# Patient Record
Sex: FEMALE | Race: OTHER
[De-identification: names, ages, dates, MRNs, and addresses within clinical notes are randomized per-mention and may not be internally consistent; named-entity substitution may affect disease eponyms.]

---

## 2019-09-25 ENCOUNTER — HOSPITAL ENCOUNTER (OUTPATIENT)
Dept: HOSPITAL 72 - PAN | Age: 59
Discharge: HOME | End: 2019-09-25
Payer: MEDICAID

## 2019-09-25 DIAGNOSIS — Z01.818: Primary | ICD-10-CM

## 2019-09-25 DIAGNOSIS — I10: ICD-10-CM

## 2019-09-25 NOTE — CONSULTATION
DATE OF CONSULTATION:  09/25/2019

CONSULTING PHYSICIAN:  Shai Hammond M.D.



CHIEF COMPLAINT:  Screening colonoscopy.



HISTORY OF PRESENT ILLNESS:  The patient is a very pleasant 59-year-old

female without any significant past medical history except for mild

hypertension, referred to us for screening colonoscopy.



PAST MEDICAL HISTORY:  Hypertension.



PAST SURGICAL HISTORY:  None.



MEDICATIONS:  Blood pressure medication.



FAMILY HISTORY:  No family history of GI malignancies.



SOCIAL HISTORY:  The patient denies any tobacco, alcohol, or drug abuse.



ALLERGIES:  No known allergies.



REVIEW OF SYSTEMS:  A 10-point review of systems was performed and

pertinent positives in HPI.



PHYSICAL EXAMINATION:

GENERAL:  This is a well-developed female, in no acute distress.

HEENT:  Normocephalic and atraumatic.  Sclerae anicteric.

NECK:  Supple.  No evidence of lymphadenopathy.

CARDIOVASCULAR:  Regular rate and rhythm.  Plus S1 and S2.  No obvious

murmur.

LUNGS:  Decreased breath sounds bilaterally based on the supine.

ABDOMEN:  Soft and nontender.  No rebound.  No guarding.  No peritoneal

sign.

EXTREMITIES:  No cyanosis.  No clubbing.  No edema.



ASSESSMENT AND PLAN:  This is a 59-year-old female, referred for us for

screening colonoscopy.  The patient was given instruction for colonoscopy

procedure and risks and benefits were explained to the  patient.  The

patient was given the prep.  She agreed to obtain authorization and as

soon as the authorization was obtained, we are going to go ahead and

schedule her.









  ______________________________________________

  Shai Hammond M.D.





DR:  DONNA

D:  09/25/2019 13:03

T:  09/25/2019 18:59

JOB#:  [id number]

CC: